# Patient Record
Sex: FEMALE | Race: WHITE | URBAN - METROPOLITAN AREA
[De-identification: names, ages, dates, MRNs, and addresses within clinical notes are randomized per-mention and may not be internally consistent; named-entity substitution may affect disease eponyms.]

---

## 2022-03-02 ENCOUNTER — HOSPITAL ENCOUNTER (EMERGENCY)
Age: 42
Discharge: HOME | End: 2022-03-02
Attending: EMERGENCY MEDICINE

## 2022-03-02 VITALS
SYSTOLIC BLOOD PRESSURE: 135 MMHG | RESPIRATION RATE: 20 BRPM | TEMPERATURE: 98.6 F | OXYGEN SATURATION: 98 % | HEART RATE: 87 BPM | DIASTOLIC BLOOD PRESSURE: 90 MMHG

## 2022-03-02 DIAGNOSIS — R47.01 MUTENESS: Primary | ICD-10-CM

## 2022-03-02 LAB
AMPHETAMINE SCREEN, URINE: ABNORMAL
ANION GAP SERPL CALCULATED.3IONS-SCNC: 20 MMOL/L (ref 3–16)
BARBITURATE SCREEN URINE: ABNORMAL
BENZODIAZEPINE SCREEN, URINE: ABNORMAL
BUN BLDV-MCNC: 10 MG/DL (ref 7–20)
CALCIUM SERPL-MCNC: 9.1 MG/DL (ref 8.3–10.6)
CANNABINOID SCREEN URINE: POSITIVE
CHLORIDE BLD-SCNC: 104 MMOL/L (ref 99–110)
CO2: 17 MMOL/L (ref 21–32)
COCAINE METABOLITE SCREEN URINE: ABNORMAL
CREAT SERPL-MCNC: 0.7 MG/DL (ref 0.6–1.1)
ETHANOL: 232 MG/DL (ref 0–0.08)
GFR AFRICAN AMERICAN: >60
GFR NON-AFRICAN AMERICAN: >60
GLUCOSE BLD-MCNC: 56 MG/DL (ref 70–99)
HCG(URINE) PREGNANCY TEST: NEGATIVE
Lab: ABNORMAL
METHADONE SCREEN, URINE: ABNORMAL
OPIATE SCREEN URINE: ABNORMAL
OXYCODONE URINE: ABNORMAL
PH UA: 6
PHENCYCLIDINE SCREEN URINE: ABNORMAL
POTASSIUM REFLEX MAGNESIUM: 4.9 MMOL/L (ref 3.5–5.1)
PROPOXYPHENE SCREEN: ABNORMAL
SODIUM BLD-SCNC: 141 MMOL/L (ref 136–145)

## 2022-03-02 PROCEDURE — 80048 BASIC METABOLIC PNL TOTAL CA: CPT

## 2022-03-02 PROCEDURE — 84703 CHORIONIC GONADOTROPIN ASSAY: CPT

## 2022-03-02 PROCEDURE — 82077 ASSAY SPEC XCP UR&BREATH IA: CPT

## 2022-03-02 PROCEDURE — 80307 DRUG TEST PRSMV CHEM ANLYZR: CPT

## 2022-03-02 PROCEDURE — 36415 COLL VENOUS BLD VENIPUNCTURE: CPT

## 2022-03-02 PROCEDURE — 4500000002 HC ER NO CHARGE

## 2022-03-02 NOTE — ED PROVIDER NOTES
[address] [facility]          ATTENDING PHYSICIAN NOTE       Date of evaluation: 3/2/2022    Chief Complaint     Other (pt reports to ER after being found in vehicle. pt not answering questions at this time. Alert and cooperative . VSS)      History of Present Illness     Andrew Ellsworth is a 43 y.o. female who presents via EMS because she was unresponsive in a vehicle. Apparently the police had stopped the vehicle and there was a gun in the vehicle and the patient became somewhat catatonic and \"unresponsive\" and would not answer any questions for police or EMS. EMS reported that all of her vital signs were stable. They thought they may have smelled a little bit of alcohol but they could not get any information from her because she was mute. Blood sugar was 106. On arrival here, the patient will not answer any questions, but does open her eyes when asked to do so. She does not speak or provide any information such as her name or age. Review of Systems     Review of Systems   Unable to perform ROS: Patient nonverbal       Past Medical, Surgical, Family, and Social History     She has no past medical history on file. She has no past surgical history on file. Her family history is not on file. She     Medications     There are no discharge medications for this patient. Allergies     She has No Known Allergies. Physical Exam     INITIAL VITALS: BP: (!) 135/90, Temp: 98.6 °F (37 °C), Pulse: 87, Resp: 20, SpO2: 98 %   Physical Exam  Vitals and nursing note reviewed. Eyes:      Extraocular Movements: Extraocular movements intact. Pupils: Pupils are equal, round, and reactive to light. Cardiovascular:      Rate and Rhythm: Normal rate and regular rhythm. Pulmonary:      Effort: Pulmonary effort is normal.      Breath sounds: Normal breath sounds. Abdominal:      Tenderness: There is no abdominal tenderness. Neurological:      General: No focal deficit present. Mental Status: She is alert. Comments: Moves all extremities spontaneously but does not follow any commands, and this is volitional.         Diagnostic Results     RADIOLOGY:  No orders to display       LABS:   No results found for this visit on 03/02/22. RECENT VITALS:  BP: (!) 135/90,Temp: 98.6 °F (37 °C), Pulse: 87, Resp: 20, SpO2: 98 %       ED Course     Nursing Notes, Past Medical Hx, Past Surgical Hx, Social Hx,Allergies, and Family Hx were reviewed. patient was given the following medications:  No orders of the defined types were placed in this encounter. CONSULTS:  None    MEDICAL DECISIONMAKING / ASSESSMENT / Rosa Clarice is a 43 y.o. female presenting for medical evaluation after being found in a vehicle where a gun was apparently noted by police. The patient is unwilling to speak to us and give us any information and has all normal vital signs. She would not answer any questions as to whether or not she had a medical issue. I suspect that the patient is attempting to avoid giving any information because of the circumstances. We did draw blood from the patient but the lab was unable to run specimens because of inadequate patient information. My suspicion that there is any medical issue going on at this time is minimal to none. After the patient had blood drawn, she apparently eloped and was nowhere to be found. Clinical Impression     1.  Muteness        Disposition     DISPOSITION         Maria T Castano MD  03/02/22 [id number]

## 2022-03-02 NOTE — ED NOTES
Patient walks out to nurses station asking for her phone. Multiple RNs state her phone is not here, it was not brought in with ambulance. Patient walking around nurses station stating she is going to leave.  Patient walks out lobby doors and out of hospital.      Elizabeth Burr RN  03/02/22 [id number]